# Patient Record
Sex: MALE | Race: WHITE | Employment: FULL TIME | URBAN - METROPOLITAN AREA
[De-identification: names, ages, dates, MRNs, and addresses within clinical notes are randomized per-mention and may not be internally consistent; named-entity substitution may affect disease eponyms.]

---

## 2017-01-27 ENCOUNTER — HOSPITAL ENCOUNTER (EMERGENCY)
Facility: CLINIC | Age: 37
Discharge: HOME | End: 2017-01-28
Attending: EMERGENCY MEDICINE | Admitting: EMERGENCY MEDICINE
Payer: COMMERCIAL

## 2017-01-27 VITALS
OXYGEN SATURATION: 98 % | TEMPERATURE: 98.6 F | SYSTOLIC BLOOD PRESSURE: 121 MMHG | RESPIRATION RATE: 18 BRPM | HEART RATE: 87 BPM | DIASTOLIC BLOOD PRESSURE: 92 MMHG

## 2017-01-27 DIAGNOSIS — S01.81XA FACIAL LACERATION, INITIAL ENCOUNTER: ICD-10-CM

## 2017-01-27 PROCEDURE — 99283 EMERGENCY DEPT VISIT LOW MDM: CPT

## 2017-01-27 PROCEDURE — 12014 RPR F/E/E/N/L/M 5.1-7.5 CM: CPT

## 2017-01-27 RX ORDER — BUPIVACAINE HYDROCHLORIDE AND EPINEPHRINE 5; 5 MG/ML; UG/ML
INJECTION, SOLUTION PERINEURAL
Status: DISCONTINUED
Start: 2017-01-27 | End: 2017-01-28 | Stop reason: HOSPADM

## 2017-01-27 ASSESSMENT — ENCOUNTER SYMPTOMS: WOUND: 1

## 2017-01-27 NOTE — ED AVS SNAPSHOT
[facility] Emergency Department    [address]    [facility] [address]    Phone:  [phone]    Fax:  [phone]                                       Mitesh Amaro   MRN: [id number]    Department:  [facility] Emergency Department   Date of Visit:  1/27/2017           After Visit Summary Signature Page     I have received my discharge instructions, and my questions have been answered. I have discussed any challenges I see with this plan with the nurse or doctor.    ..........................................................................................................................................  Patient/Patient Representative Signature      ..........................................................................................................................................  Patient Representative Print Name and Relationship to Patient    ..................................................               ................................................  Date                                            Time    ..........................................................................................................................................  Reviewed by Signature/Title    ...................................................              ..............................................  Date                                                            Time

## 2017-01-27 NOTE — ED AVS SNAPSHOT
[facility] Emergency Department    [address] [place] [address]    Phone:  [phone]    Fax:  [phone]                                       Mitesh Amaro   MRN: [id number]    Department:  [facility] Emergency Department   Date of Visit:  1/27/2017           Patient Information     Date Of Birth          1980        Your diagnoses for this visit were:     Facial laceration, initial encounter        You were seen by Srini Mejia MD.      Follow-up Information     Follow up with Linus Collier MD In 10 days.    Specialty:  Internal Medicine    Why:  For suture removal, For wound re-check    Contact information:    [facility]  [address] [facility] [address]  [facility] [address]  [phone]          Discharge Instructions         Face Laceration: Suture or Tape  A laceration is a cut through the skin. This will require stitches if it is deep. Minor cuts may be treated with surgical tape.    Home care    Your healthcare provider may prescribe an antibiotic. This is to help prevent infection. Follow all instructions for taking this medicine. Take the medicine every day until it is gone or you are told to stop. You should not have any left over.    The healthcare provider may prescribe medicines for pain. Follow instructions for taking them.    Follow the healthcare provider s instructions on how to care for the cut.    Wash your hands with soap and warm water before and after caring for the cut. This helps prevent infection.    If a bandage was applied and it becomes wet or dirty, replace it. Otherwise, leave it in place for the first 24 hours, then change it once a day or as directed.    If sutures were used, clean the wound daily:    After removing the bandage, wash the area with soap and water. Use a wet cotton swab to loosen and remove any blood or crust that forms.    After cleaning, keep the wound clean and dry. Talk with your doctor  before applying any antibiotic ointment to the wound. Reapply a fresh bandage.    You may remove the bandage to shower as usual after the first 24 hours, but do not soak the area in water (no swimming) until the sutures are removed.    If surgical tape was used, keep the area clean and dry. If it becomes wet, blot it dry with a towel.    Most facial skin wounds heal without problems. However, an infection sometimes occurs despite proper treatment. Therefore, watch for the signs of infection listed below.  Follow-up care  Follow up with your healthcare provider as advised. Be sure to return for suture removal as directed. Ask your provider how long sutures should remain in place. If surgical tape closures were used, you may remove them yourself when your provider recommends if they have not fallen off on their own.  When to seek medical advice  Call your healthcare provider right away if any of these occur:    Wound bleeding not controlled by direct pressure    Signs of infection, including increasing pain in the wound, increasing wound redness or swelling, or pus or bad odor coming from the wound    Fever of 100.4 F (38 C) or higher or as directed by your healthcare provider    Stitches come apart or fall out or surgical tape falls off before 5 days    Wound edges re-open    Wound changes colors    Numbness around the wound     2245-6417 [employer]. [address] [place] [address]. All rights reserved. This information is not intended as a substitute for professional medical care. Always follow your healthcare professional's instructions.          24 Hour Appointment Hotline       To make an appointment at any [facility], call [phone] ([phone]). If you don't have a family doctor or clinic, we will help you find one. [facility] are conveniently located to serve the needs of you and your family.             Review of your medicines      Our records show that you are  taking the medicines listed below. If these are incorrect, please call your family doctor or clinic.        Dose / Directions Last dose taken    neomycin-polymyxin-hydrocortisone [id number] otic suspension   Commonly known as:  CORTISPORIN   Dose:  4 drop   Quantity:  10 mL        Place 4 drops in ear(s) 4 times daily   Refills:  0        NO ACTIVE MEDICATIONS        Refills:  0        simvastatin 20 MG tablet   Commonly known as:  ZOCOR   Dose:  20 mg   Quantity:  90 tablet        Take 1 tablet (20 mg) by mouth At Bedtime   Refills:  1                Orders Needing Specimen Collection     None      Pending Results     No orders found for last 2 day(s).            Pending Culture Results     No orders found for last 2 day(s).             Test Results from your hospital stay            Clinical Quality Measure: Blood Pressure Screening     Your blood pressure was checked while you were in the emergency department today. The last reading we obtained was  BP: (!) 121/92 mmHg . Please read the guidelines below about what these numbers mean and what you should do about them.  If your systolic blood pressure (the top number) is less than 120 and your diastolic blood pressure (the bottom number) is less than 80, then your blood pressure is normal. There is nothing more that you need to do about it.  If your systolic blood pressure (the top number) is 120-139 or your diastolic blood pressure (the bottom number) is 80-89, your blood pressure may be higher than it should be. You should have your blood pressure rechecked within a year by a primary care provider.  If your systolic blood pressure (the top number) is 140 or greater or your diastolic blood pressure (the bottom number) is 90 or greater, you may have high blood pressure. High blood pressure is treatable, but if left untreated over time it can put you at risk for heart attack, stroke, or kidney failure. You should have your blood pressure rechecked by a primary care  provider within the next 4 weeks.  If your provider in the emergency department today gave you specific instructions to follow-up with your doctor or provider even sooner than that, you should follow that instruction and not wait for up to 4 weeks for your follow-up visit.        Thank you for choosing [facility]       Thank you for choosing [facility] for your care. Our goal is always to provide you with excellent care. Hearing back from our patients is one way we can continue to improve our services. Please take a few minutes to complete the written survey that you may receive in the mail after you visit with us. Thank you!        [employer] Information     [employer] gives you secure access to your electronic health record. If you see a primary care provider, you can also send messages to your care team and make appointments. If you have questions, please call your primary care clinic.  If you do not have a primary care provider, please call [phone] and they will assist you.        Care EveryWhere ID     This is your Care EveryWhere ID. This could be used by other organizations to access your [facility] medical records  [id number]        After Visit Summary       This is your record. Keep this with you and show to your community pharmacist(s) and doctor(s) at your next visit.

## 2017-01-28 RX ORDER — GINSENG 100 MG
CAPSULE ORAL
Status: DISCONTINUED
Start: 2017-01-28 | End: 2017-01-28 | Stop reason: HOSPADM

## 2017-01-28 NOTE — ED PROVIDER NOTES
History     Chief Complaint:  Head Laceration    HPI   Mitesh Amaro is a 36 year old male who presents with a head laceration. The patient states that he was walking down the sidewalk this evening when he tripped and hit his head on the sidewalk, causing a laceration to his forehead. He denies any loss of consciousness, vomiting, or any other pertinent symptoms. He is unsure on what his last tetanus shot was.     Allergies:  Contrast dye  Sulfa drugs     Medications:    Cortisporin  Zocor     Past Medical History:    HLD  TBI    Past Surgical History:    History reviewed.  No significant past surgical history.     Family History:    History reviewed.  No significant family history.     Social History:  Relationship status: Single  Tobacco use: Neg  Alcohol use: Pos (2 drinks per week)  The patient presents with his mother.        Review of Systems   Skin: Positive for wound.   Neurological:        Negative for loss of consciousness   All other systems reviewed and are negative.    Physical Exam   First Vitals:  BP: (!) 121/92 mmHg  Pulse: 87  Temp: 98.6  F (37  C)  Resp: 18  SpO2: 98 %    Physical Exam  Constitutional: Patient is well appearing. No distress.  Head: Normal head to toe trauma exam except for a 5.5 cm linear laceration to the forehead.   Mouth/Throat: Oropharynx is clear and moist. No oropharyngeal exudate.  Eyes: Conjunctivae and EOM are normal. No scleral icterus.  Neck: Normal range of motion. Neck supple.   Cardiovascular: Normal rate, regular rhythm, normal heart sounds and intact distal pulses.   Pulmonary/Chest: Breath sounds normal. No respiratory distress.  Abdominal: Soft. Bowel sounds are normal. No distension. No tenderness. No rebound or guarding.   Musculoskeletal: Normal range of motion. No edema or tenderness.   Neurological: Alert and orientated to person, place, and time. No observable focal neuro deficit  Skin: Warm and dry. No rash noted. Not diaphoretic.     Emergency  Department Course     Procedures:     Laceration Repair      LACERATION:  A Linear Laceration minimally Contaminated 5.5 cm laceration.    LOCATION:  Forehead.    FUNCTION:  Distally sensation, circulation and tendon function are intact.    ANESTHESIA:  Local using Bupivacaine total of 5 mLs.    PREPARATION:  Irrigation with Normal Saline.    DEBRIDEMENT:  wound explored, no foreign body found.    CLOSURE:  Wound was closed with One Layer.  Skin closed with 9 x 4.0 Ethylon using 2 vertical mattresses and 7 interrupted sutures..    Interventions:  2329: Bupivacaine, 5 mLs    Emergency Department Course:  Nursing notes and vitals reviewed.  I performed an exam of the patient as documented above.  The above workup was undertaken.  2329: I used bupivacaine to numb the patient's forehead.   0005: I performed the above noted laceration repair. See above for details.    Findings and plan explained to the Patient and mother. Patient discharged home with instructions regarding supportive care, medications, and reasons to return. The importance of close follow-up was reviewed.     Impression & Plan      Medical Decision Making:  Vertical laceration.  Closed with good margin.  Scarring infection and wound care instructions given.    All questions and concerns were answered. The patient was discharged home and recommended to follow up with his primary physician and return with any new or worsening symptoms.     Diagnosis:    ICD-10-CM   1. Facial laceration, initial encounter S01.81XA       Disposition:  Discharge to home with primary care follow up.      Se MARTIN, am serving as a scribe on 1/27/2017 at 11:20 PM to personally document services performed by Srini Mejia MD, based on my observations and the provider's statements to me.     [facility] EMERGENCY DEPARTMENT        Srini Mejia MD  01/28/17 0116

## 2017-01-28 NOTE — DISCHARGE INSTRUCTIONS
Face Laceration: Suture or Tape  A laceration is a cut through the skin. This will require stitches if it is deep. Minor cuts may be treated with surgical tape.    Home care    Your healthcare provider may prescribe an antibiotic. This is to help prevent infection. Follow all instructions for taking this medicine. Take the medicine every day until it is gone or you are told to stop. You should not have any left over.    The healthcare provider may prescribe medicines for pain. Follow instructions for taking them.    Follow the healthcare provider s instructions on how to care for the cut.    Wash your hands with soap and warm water before and after caring for the cut. This helps prevent infection.    If a bandage was applied and it becomes wet or dirty, replace it. Otherwise, leave it in place for the first 24 hours, then change it once a day or as directed.    If sutures were used, clean the wound daily:    After removing the bandage, wash the area with soap and water. Use a wet cotton swab to loosen and remove any blood or crust that forms.    After cleaning, keep the wound clean and dry. Talk with your doctor before applying any antibiotic ointment to the wound. Reapply a fresh bandage.    You may remove the bandage to shower as usual after the first 24 hours, but do not soak the area in water (no swimming) until the sutures are removed.    If surgical tape was used, keep the area clean and dry. If it becomes wet, blot it dry with a towel.    Most facial skin wounds heal without problems. However, an infection sometimes occurs despite proper treatment. Therefore, watch for the signs of infection listed below.  Follow-up care  Follow up with your healthcare provider as advised. Be sure to return for suture removal as directed. Ask your provider how long sutures should remain in place. If surgical tape closures were used, you may remove them yourself when your provider recommends if they have not fallen off on  their own.  When to seek medical advice  Call your healthcare provider right away if any of these occur:    Wound bleeding not controlled by direct pressure    Signs of infection, including increasing pain in the wound, increasing wound redness or swelling, or pus or bad odor coming from the wound    Fever of 100.4 F (38 C) or higher or as directed by your healthcare provider    Stitches come apart or fall out or surgical tape falls off before 5 days    Wound edges re-open    Wound changes colors    Numbness around the wound     6855-4610 [employer]. [address] [place] [address]. All rights reserved. This information is not intended as a substitute for professional medical care. Always follow your healthcare professional's instructions.

## 2017-01-28 NOTE — ED NOTES
36-year-old male presents to the ER with complaints of head laceration. Pt fell and had a large forehead laceration. Denies LOC

## 2017-11-13 ENCOUNTER — TELEPHONE (OUTPATIENT)
Dept: FAMILY MEDICINE | Facility: CLINIC | Age: 37
End: 2017-11-13

## 2017-11-13 DIAGNOSIS — M25.569 ACUTE KNEE PAIN, UNSPECIFIED LATERALITY: Primary | ICD-10-CM

## 2017-11-13 NOTE — TELEPHONE ENCOUNTER
Reason for Call: Request for an order or referral:    Order or referral being requested: referral or order for [facility]    Date needed: as soon as possible    Has the patient been seen by the PCP for this problem? NO    Additional comments: patient has been deployed.  He is back in town now, and did schedule a physical with Dr. Collier  For 11/21.  He has had knee issues, and needs to get clearance from an orthopedic specialist.  Is there a way he can get this referral asap so he can schedule with them.    Phone number Patient can be reached at:  Home number on file [phone] (home)    Best Time:  asap    Can we leave a detailed message on this number?  YES    Call taken on 11/13/2017 at 11:06 AM by Andria Jones  .

## 2017-12-05 ENCOUNTER — OFFICE VISIT (OUTPATIENT)
Dept: FAMILY MEDICINE | Facility: CLINIC | Age: 37
End: 2017-12-05
Payer: COMMERCIAL

## 2017-12-05 VITALS
DIASTOLIC BLOOD PRESSURE: 70 MMHG | WEIGHT: 224 LBS | HEART RATE: 52 BPM | SYSTOLIC BLOOD PRESSURE: 119 MMHG | TEMPERATURE: 98 F | OXYGEN SATURATION: 98 % | HEIGHT: 75 IN | BODY MASS INDEX: 27.85 KG/M2

## 2017-12-05 DIAGNOSIS — Z00.00 ROUTINE GENERAL MEDICAL EXAMINATION AT A HEALTH CARE FACILITY: Primary | ICD-10-CM

## 2017-12-05 DIAGNOSIS — Z82.49 FAMILY HISTORY OF EARLY CAD: ICD-10-CM

## 2017-12-05 LAB
ERYTHROCYTE [DISTWIDTH] IN BLOOD BY AUTOMATED COUNT: 13.4 % (ref 10–15)
HCT VFR BLD AUTO: 43.3 % (ref 40–53)
HGB BLD-MCNC: 14.2 G/DL (ref 13.3–17.7)
MCH RBC QN AUTO: 31.1 PG (ref 26.5–33)
MCHC RBC AUTO-ENTMCNC: 32.8 G/DL (ref 31.5–36.5)
MCV RBC AUTO: 95 FL (ref 78–100)
PLATELET # BLD AUTO: 182 10E9/L (ref 150–450)
RBC # BLD AUTO: 4.56 10E12/L (ref 4.4–5.9)
WBC # BLD AUTO: 4.1 10E9/L (ref 4–11)

## 2017-12-05 PROCEDURE — 36415 COLL VENOUS BLD VENIPUNCTURE: CPT | Performed by: INTERNAL MEDICINE

## 2017-12-05 PROCEDURE — 85027 COMPLETE CBC AUTOMATED: CPT | Performed by: INTERNAL MEDICINE

## 2017-12-05 PROCEDURE — 80061 LIPID PANEL: CPT | Performed by: INTERNAL MEDICINE

## 2017-12-05 PROCEDURE — 80053 COMPREHEN METABOLIC PANEL: CPT | Performed by: INTERNAL MEDICINE

## 2017-12-05 PROCEDURE — 99395 PREV VISIT EST AGE 18-39: CPT | Performed by: INTERNAL MEDICINE

## 2017-12-05 NOTE — PATIENT INSTRUCTIONS
Preventive Health Recommendations  Male Ages 26 - 39    Yearly exam:             See your health care provider every year in order to  o   Review health changes.   o   Discuss preventive care.    o   Review your medicines if your doctor has prescribed any.    You should be tested each year for STDs (sexually transmitted diseases), if you re at risk.     After age 35, talk to your provider about cholesterol testing. If you are at risk for heart disease, have your cholesterol tested at least every 5 years.     If you are at risk for diabetes, you should have a diabetes test (fasting glucose).  Shots: Get a flu shot each year. Get a tetanus shot every 10 years.     Nutrition:    Eat at least 5 servings of fruits and vegetables daily.     Eat whole-grain bread, whole-wheat pasta and brown rice instead of white grains and rice.     Talk to your provider about Calcium and Vitamin D.     Lifestyle    Exercise for at least 150 minutes a week (30 minutes a day, 5 days a week). This will help you control your weight and prevent disease.     Limit alcohol to one drink per day.     No smoking.     Wear sunscreen to prevent skin cancer.     See your dentist every six months for an exam and cleaning.   (Z00.00) Routine general medical examination at a health care facility  (primary encounter diagnosis)  Comment: For routine exam, we will draw labs as ordered, cholesterol, diabetes mellitus check, liver function, renal function, and refer to dermatology.  We will also update vaccination history.  Plan: CBC with platelets, Lipid panel reflex to         direct LDL Fasting, Comprehensive metabolic         panel, DERMATOLOGY REFERRAL            [facility] - [phone]

## 2017-12-05 NOTE — NURSING NOTE
"Chief Complaint   Patient presents with     Physical     Fasting        Initial /70 (BP Location: Right arm, Patient Position: Chair, Cuff Size: Adult Large)  Pulse 52  Temp 98  F (36.7  C) (Tympanic)  Ht 6' 3\" (1.905 m)  Wt 224 lb (101.6 kg)  SpO2 98%  BMI 28 kg/m2 Estimated body mass index is 28 kg/(m^2) as calculated from the following:    Height as of this encounter: 6' 3\" (1.905 m).    Weight as of this encounter: 224 lb (101.6 kg)..    BP completed using cuff size: regular  MEDICATIONS REVIEWED  SOCIAL AND FAMILY HX REVIEWED  Dina Dorsey CMA  "

## 2017-12-05 NOTE — PROGRESS NOTES
SUBJECTIVE:   CC: Mitesh Amaro is an 37 year old male who presents for preventative health visit.     Healthy Habits:    Do you get at least three servings of calcium containing foods daily (dairy, green leafy vegetables, etc.)? yes    Amount of exercise or daily activities, outside of work: 6 day(s) per week    Problems taking medications regularly not applicable    Medication side effects: No    Have you had an eye exam in the past two years? yes    Do you see a dentist twice per year? yes  Do you have sleep apnea, excessive snoring or daytime drowsiness?no      Today's PHQ-2 Score:   PHQ-2 ( 1999 [employer]) 5/15/2013   Q1: Little interest or pleasure in doing things 0   Q2: Feeling down, depressed or hopeless 0   PHQ-2 Score 0         Abuse: Current or Past(Physical, Sexual or Emotional)- No  Do you feel safe in your environment - Yes  Social History   Substance Use Topics     Smoking status: Never Smoker     Smokeless tobacco: Never Used     Alcohol use 1.2 oz/week     2 Standard drinks or equivalent per week     The patient does not drink >3 drinks per day nor >7 drinks per week.    Last PSA: No results found for: PSA    Reviewed orders with patient. Reviewed health maintenance and updated orders accordingly - Yes  Labs reviewed in EPIC    Reviewed and updated as needed this visit by clinical staff         Reviewed and updated as needed this visit by Provider        Past Medical History:   Diagnosis Date     Hyperlipidemia LDL goal <160 10/8/2013        ROS:  C: NEGATIVE for fever, chills, change in weight  I: NEGATIVE for worrisome rashes, moles or lesions  E: NEGATIVE for vision changes or irritation  ENT: NEGATIVE for ear, mouth and throat problems  R: NEGATIVE for significant cough or SOB  CV: NEGATIVE for chest pain, palpitations or peripheral edema  GI: NEGATIVE for nausea, abdominal pain, heartburn, or change in bowel habits   male: negative for dysuria, hematuria, decreased urinary stream, erectile  "dysfunction, urethral discharge  M: NEGATIVE for significant arthralgias or myalgia  N: NEGATIVE for weakness, dizziness or paresthesias  P: NEGATIVE for changes in mood or affect    OBJECTIVE:   /70 (BP Location: Right arm, Patient Position: Chair, Cuff Size: Adult Large)  Pulse 52  Temp 98  F (36.7  C) (Tympanic)  Ht 6' 3\" (1.905 m)  Wt 224 lb (101.6 kg)  SpO2 98%  BMI 28 kg/m2  EXAM:  GENERAL: healthy, alert and no distress  EYES: Eyes grossly normal to inspection, PERRL and conjunctivae and sclerae normal  HENT: ear canals and TM's normal, nose and mouth without ulcers or lesions  NECK: no adenopathy, no asymmetry, masses, or scars and thyroid normal to palpation  RESP: lungs clear to auscultation - no rales, rhonchi or wheezes  CV: regular rate and rhythm, normal S1 S2, no S3 or S4, no murmur, click or rub, no peripheral edema and peripheral pulses strong  ABDOMEN: soft, nontender, no hepatosplenomegaly, no masses and bowel sounds normal  MS: no gross musculoskeletal defects noted, no edema  SKIN: no suspicious lesions or rashes  NEURO: Normal strength and tone, mentation intact and speech normal  PSYCH: mentation appears normal, affect normal/bright    ASSESSMENT/PLAN:   (Z00.00) Routine general medical examination at a health care facility  (primary encounter diagnosis)  Comment: For routine exam, we will draw labs as ordered, cholesterol, diabetes mellitus check, liver function, renal function, and refer to dermatology.  We will also update vaccination history.  Plan: CBC with platelets, Lipid panel reflex to         direct LDL Fasting, Comprehensive metabolic         panel, DERMATOLOGY REFERRAL            [facility] - [phone]         COUNSELING:  Reviewed preventive health counseling, as reflected in patient instructions           reports that he has never smoked. He has never used smokeless tobacco.      Estimated body mass index is 27.5 kg/(m^2) as calculated from the following:    " "Height as of 2/8/16: 6' 3\" (1.905 m).    Weight as of 2/8/16: 220 lb (99.8 kg).         Counseling Resources:  ATP IV Guidelines  Pooled Cohorts Equation Calculator  FRAX Risk Assessment  ICSI Preventive Guidelines  Dietary Guidelines for Americans, 2010  USDA's MyPlate  ASA Prophylaxis  Lung CA Screening    Linus Collier MD, MD  [facility]  "

## 2017-12-05 NOTE — MR AVS SNAPSHOT
After Visit Summary   12/5/2017    Mitesh Amaro    MRN: [id number]           Patient Information     Date Of Birth          1980        Visit Information        Provider Department      12/5/2017 1:30 PM Linus Collier MD [facility]        Today's Diagnoses     Routine general medical examination at a health care facility    -  1      Care Instructions      Preventive Health Recommendations  Male Ages 26 - 39    Yearly exam:             See your health care provider every year in order to  o   Review health changes.   o   Discuss preventive care.    o   Review your medicines if your doctor has prescribed any.    You should be tested each year for STDs (sexually transmitted diseases), if you re at risk.     After age 35, talk to your provider about cholesterol testing. If you are at risk for heart disease, have your cholesterol tested at least every 5 years.     If you are at risk for diabetes, you should have a diabetes test (fasting glucose).  Shots: Get a flu shot each year. Get a tetanus shot every 10 years.     Nutrition:    Eat at least 5 servings of fruits and vegetables daily.     Eat whole-grain bread, whole-wheat pasta and brown rice instead of white grains and rice.     Talk to your provider about Calcium and Vitamin D.     Lifestyle    Exercise for at least 150 minutes a week (30 minutes a day, 5 days a week). This will help you control your weight and prevent disease.     Limit alcohol to one drink per day.     No smoking.     Wear sunscreen to prevent skin cancer.     See your dentist every six months for an exam and cleaning.   (Z00.00) Routine general medical examination at a health care facility  (primary encounter diagnosis)  Comment: For routine exam, we will draw labs as ordered, cholesterol, diabetes mellitus check, liver function, renal function, and refer to dermatology.  We will also update vaccination history.  Plan: CBC with platelets, Lipid panel  reflex to         direct LDL Fasting, Comprehensive metabolic         panel, DERMATOLOGY REFERRAL                    Follow-ups after your visit        Additional Services     DERMATOLOGY REFERRAL       Your provider has referred you to: [facility]: [facility]on [phone]   [email or web address]    Please be aware that coverage of these services is subject to the terms and limitations of your health insurance plan.  Call member services at your health plan with any benefit or coverage questions.      Please bring the following with you to your appointment:    (1) Any X-Rays, CTs or MRIs which have been performed.  Contact the facility where they were done to arrange for  prior to your scheduled appointment.    (2) List of current medications  (3) This referral request   (4) Any documents/labs given to you for this referral                  Who to contact     If you have questions or need follow up information about today's clinic visit or your schedule please contact [facility] directly at [phone].  Normal or non-critical lab and imaging results will be communicated to you by Cameramahart, letter or phone within 4 business days after the clinic has received the results. If you do not hear from us within 7 days, please contact the clinic through Cameramahart or phone. If you have a critical or abnormal lab result, we will notify you by phone as soon as possible.  Submit refill requests through [employer] or call your pharmacy and they will forward the refill request to us. Please allow 3 business days for your refill to be completed.          Additional Information About Your Visit        CameramaharBravoavia Information     [employer] gives you secure access to your electronic health record. If you see a primary care provider, you can also send messages to your care team and make appointments. If you have questions, please call your primary care clinic.  If you do not have a primary care  "provider, please call [phone] and they will assist you.        Care EveryWhere ID     This is your Care EveryWhere ID. This could be used by other organizations to access your [facility] medical records  [id number]        Your Vitals Were     Pulse Temperature Height Pulse Oximetry BMI (Body Mass Index)       52 98  F (36.7  C) (Tympanic) 6' 3\" (1.905 m) 98% 28 kg/m2        Blood Pressure from Last 3 Encounters:   12/05/17 119/70   01/27/17 (!) 121/92   02/08/16 (!) 133/91    Weight from Last 3 Encounters:   12/05/17 224 lb (101.6 kg)   02/08/16 220 lb (99.8 kg)   10/28/13 217 lb 6.4 oz (98.6 kg)              We Performed the Following     CBC with platelets     Comprehensive metabolic panel     DERMATOLOGY REFERRAL     Lipid panel reflex to direct LDL Fasting        Primary Care Provider Office Phone # Fax #    Linus Collier [phone] [address] [address]        Equal Access to Services     [facility]: Hadii luis manuel ku hadasho Sonain, waaxda lujoaquinaadaha, qaybta kaalmalamberto connell, lukas best . So [facility] [phone].    ATENCIÓN: Si habla español, tiene a pina disposición servicios gratuitos de asistencia lingüística. [facility] [phone].    We comply with applicable federal civil rights laws and [place] laws. We do not discriminate on the basis of race, color, national origin, age, disability, sex, sexual orientation, or gender identity.            Thank you!     Thank you for choosing [facility]  for your care. Our goal is always to provide you with excellent care. Hearing back from our patients is one way we can continue to improve our services. Please take a few minutes to complete the written survey that you may receive in the mail after your visit with us. Thank you!             Your Updated Medication List - Protect others around you: Learn how to safely use, store and throw away your medicines at " www.disposemymeds.org.          This list is accurate as of: 12/5/17  2:36 PM.  Always use your most recent med list.                   Brand Name Dispense Instructions for use Diagnosis    NO ACTIVE MEDICATIONS

## 2017-12-06 LAB
ALBUMIN SERPL-MCNC: 4.3 G/DL (ref 3.4–5)
ALP SERPL-CCNC: 43 U/L (ref 40–150)
ALT SERPL W P-5'-P-CCNC: 29 U/L (ref 0–70)
ANION GAP SERPL CALCULATED.3IONS-SCNC: 5 MMOL/L (ref 3–14)
AST SERPL W P-5'-P-CCNC: 20 U/L (ref 0–45)
BILIRUB SERPL-MCNC: 0.7 MG/DL (ref 0.2–1.3)
BUN SERPL-MCNC: 17 MG/DL (ref 7–30)
CALCIUM SERPL-MCNC: 8.9 MG/DL (ref 8.5–10.1)
CHLORIDE SERPL-SCNC: 107 MMOL/L (ref 94–109)
CHOLEST SERPL-MCNC: 295 MG/DL
CO2 SERPL-SCNC: 29 MMOL/L (ref 20–32)
CREAT SERPL-MCNC: 1 MG/DL (ref 0.66–1.25)
GFR SERPL CREATININE-BSD FRML MDRD: 85 ML/MIN/1.7M2
GLUCOSE SERPL-MCNC: 91 MG/DL (ref 70–99)
HDLC SERPL-MCNC: 64 MG/DL
LDLC SERPL CALC-MCNC: 210 MG/DL
NONHDLC SERPL-MCNC: 231 MG/DL
POTASSIUM SERPL-SCNC: 4.3 MMOL/L (ref 3.4–5.3)
PROT SERPL-MCNC: 7.5 G/DL (ref 6.8–8.8)
SODIUM SERPL-SCNC: 141 MMOL/L (ref 133–144)
TRIGL SERPL-MCNC: 104 MG/DL

## 2017-12-06 NOTE — PROGRESS NOTES
Jay Sagastume,    I had the opportunity to review your recent labs and a summary of your labs reads as follows:    Your complete blood counts show no sign of anemia, normal white blood cell count and platelets.  Your comprehensive metabolic panel showed normal renal function, normal liver function, and normal fasting blood glucose indicating no evidence of diabetes mellitus.  Your fasting lipid panel show  - normal HDL (good) cholesterol -as your goal is greater than 40  - elevated LDL (bad) cholesterol as your goal is less than 160  - normal triglyceride levels    Let's plan to obtain the CT coronary calcium score and this could help to determine need for statin, however with LDL >200 we may still wish to start a low dose statin.  [facility] [facility] - [phone]       Sincerely,  Linus Collier MD

## 2018-01-03 ENCOUNTER — TELEPHONE (OUTPATIENT)
Dept: FAMILY MEDICINE | Facility: CLINIC | Age: 38
End: 2018-01-03

## 2018-01-03 DIAGNOSIS — L98.8 OTHER SPECIFIED DISORDERS OF THE SKIN AND SUBCUTANEOUS TISSUE: Primary | ICD-10-CM

## 2018-01-03 NOTE — TELEPHONE ENCOUNTER
Updated referral is pending. Please review,advise and sign. Thanks  Leydi Adams- [facility]

## 2018-01-03 NOTE — TELEPHONE ENCOUNTER
Reason for Call: Request for an order or referral:    Order or referral being requested: Referral for dermatology    Date needed: as soon as possible    Has the patient been seen by the PCP for this problem? YES    Additional comments: Pt recently changed insurance to [employer] and needs referral sent again to [facility] because they are not seeing a referral. Referral in chart says [facility]. Pt has appointment set for 1/8    Phone number Patient can be reached at:  Home number on file [phone] (home)    Best Time:  Anytime    Can we leave a detailed message on this number?  YES    Call taken on 1/3/2018 at 3:10 PM by Geraldine Benavides

## 2019-08-20 ENCOUNTER — OFFICE VISIT (OUTPATIENT)
Dept: FAMILY MEDICINE | Facility: CLINIC | Age: 39
End: 2019-08-20
Payer: OTHER MISCELLANEOUS

## 2019-08-20 VITALS
WEIGHT: 232 LBS | SYSTOLIC BLOOD PRESSURE: 118 MMHG | HEART RATE: 60 BPM | OXYGEN SATURATION: 97 % | DIASTOLIC BLOOD PRESSURE: 77 MMHG | HEIGHT: 75 IN | BODY MASS INDEX: 28.85 KG/M2 | TEMPERATURE: 98 F

## 2019-08-20 DIAGNOSIS — E78.5 HYPERLIPIDEMIA LDL GOAL <160: ICD-10-CM

## 2019-08-20 DIAGNOSIS — Z01.818 PREOP GENERAL PHYSICAL EXAM: Primary | ICD-10-CM

## 2019-08-20 LAB
ERYTHROCYTE [DISTWIDTH] IN BLOOD BY AUTOMATED COUNT: 12.5 % (ref 10–15)
HCT VFR BLD AUTO: 46 % (ref 40–53)
HGB BLD-MCNC: 15.4 G/DL (ref 13.3–17.7)
MCH RBC QN AUTO: 31.6 PG (ref 26.5–33)
MCHC RBC AUTO-ENTMCNC: 33.5 G/DL (ref 31.5–36.5)
MCV RBC AUTO: 94 FL (ref 78–100)
PLATELET # BLD AUTO: 196 10E9/L (ref 150–450)
RBC # BLD AUTO: 4.88 10E12/L (ref 4.4–5.9)
WBC # BLD AUTO: 4.4 10E9/L (ref 4–11)

## 2019-08-20 PROCEDURE — 85027 COMPLETE CBC AUTOMATED: CPT | Performed by: INTERNAL MEDICINE

## 2019-08-20 PROCEDURE — 99214 OFFICE O/P EST MOD 30 MIN: CPT | Performed by: INTERNAL MEDICINE

## 2019-08-20 PROCEDURE — 36415 COLL VENOUS BLD VENIPUNCTURE: CPT | Performed by: INTERNAL MEDICINE

## 2019-08-20 PROCEDURE — 80048 BASIC METABOLIC PNL TOTAL CA: CPT | Performed by: INTERNAL MEDICINE

## 2019-08-20 ASSESSMENT — MIFFLIN-ST. JEOR: SCORE: 2060.04

## 2019-08-20 NOTE — PATIENT INSTRUCTIONS
Before Your Surgery      Call your surgeon if there is any change in your health. This includes signs of a cold or flu (such as a sore throat, runny nose, cough, rash or fever).    Do not smoke, drink alcohol or take over the counter medicine (unless your surgeon or primary care doctor tells you to) for the 24 hours before and after surgery.    If you take prescribed drugs: Follow your doctor s orders about which medicines to take and which to stop until after surgery.    Eating and drinking prior to surgery: follow the instructions from your surgeon    Take a shower or bath the night before surgery. Use the soap your surgeon gave you to gently clean your skin. If you do not have soap from your surgeon, use your regular soap. Do not shave or scrub the surgery site.  Wear clean pajamas and have clean sheets on your bed.     (Z01.818) Preop general physical exam  (primary encounter diagnosis)  Comment: you are medically optimized for your upcoming surgery pending labs.  Hold all aspirin, NSAIDS until after surgery.  Don't drink alcohol until after surgery  Plan: CBC with platelets, Basic metabolic panel            (E78.5) Hyperlipidemia LDL goal <160  Comment: check fasting lipid panel in 3 months.  We will refer for CT Calcium score [facility] - [phone]   Plan: CT Coronary Calcium Scan

## 2019-08-20 NOTE — PROGRESS NOTES
[address] [address]  [phone]  Dept: [phone]    PRE-OP EVALUATION:  Today's date: 2019    Mitesh Amaro (: 1980) presents for pre-operative evaluation assessment as requested by Dr. Mantilla.  He requires evaluation and anesthesia risk assessment prior to undergoing surgery/procedure for treatment of  Right ulnar TFCC tear .    Proposed Surgery/ Procedure: Right distal ulna cartilage injury  Date of Surgery/ Procedure: 2019  Time of Surgery/ Procedure: 7:20 a.m.  Hospital/Surgical Facility: [facility]  Fax number for surgical facility: [phone]  Primary Physician: Linus Collier  Type of Anesthesia Anticipated: General    Patient has a Health Care Directive or Living Will:  NO    1. NO - Do you have a history of heart attack, stroke, stent, bypass or surgery on an artery in the head, neck, heart or legs?  2. NO - Do you ever have any pain or discomfort in your chest?  3. NO - Do you have a history of  Heart Failure?  4. NO - Are you troubled by shortness of breath when: walking on the level, up a slight hill or at night?  5. NO - Do you currently have a cold, bronchitis or other respiratory infection?  6. NO - Do you have a cough, shortness of breath or wheezing?  7. NO - Do you sometimes get pains in the calves of your legs when you walk?  8. NO - Do you or anyone in your family have previous history of blood clots?  9. NO - Do you or does anyone in your family have a serious bleeding problem such as prolonged bleeding following surgeries or cuts?  10. NO - Have you ever had problems with anemia or been told to take iron pills?  11. NO - Have you had any abnormal blood loss such as black, tarry or bloody stools, or abnormal vaginal bleeding?  12. NO - Have you ever had a blood transfusion?  13. NO - Have you or any of your relatives ever had problems with anesthesia?  14. NO - Do you have sleep apnea, excessive  "snoring or daytime drowsiness?  15. NO - Do you have any prosthetic heart valves?  16. NO - Do you have prosthetic joints?  17. NO - Is there any chance that you may be pregnant?      HPI:     HPI related to upcoming procedure: trauma to the right wrist - TFCC fracture      See problem list for active medical problems.  Problems all longstanding and stable, except as noted/documented.  See ROS for pertinent symptoms related to these conditions.      MEDICAL HISTORY:     Patient Active Problem List    Diagnosis Date Noted     Hyperlipidemia LDL goal <160 10/08/2013     Priority: Medium     TBI (traumatic brain injury) (H) 05/15/2013     Priority: Medium     Suffered in 09/2012        Past Medical History:   Diagnosis Date     Hyperlipidemia LDL goal <160 10/8/2013     No past surgical history on file.  Current Outpatient Medications   Medication Sig Dispense Refill     NO ACTIVE MEDICATIONS        OTC products: NSAIDS    Allergies   Allergen Reactions     Contrast Dye      Sulfa Drugs       Latex Allergy: NO    Social History     Tobacco Use     Smoking status: Never Smoker     Smokeless tobacco: Never Used   Substance Use Topics     Alcohol use: Yes     Alcohol/week: 1.2 oz     Types: 2 Standard drinks or equivalent per week     History   Drug Use No       REVIEW OF SYSTEMS:   CONSTITUTIONAL: NEGATIVE for fever, chills, change in weight  ENT/MOUTH: NEGATIVE for ear, mouth and throat problems  RESP: NEGATIVE for significant cough or SOB  CV: NEGATIVE for chest pain, palpitations or peripheral edema    EXAM:   /77 (BP Location: Right arm, Cuff Size: Adult Large)   Pulse 60   Temp 98  F (36.7  C) (Oral)   Ht 1.892 m (6' 2.5\")   Wt 105.2 kg (232 lb)   SpO2 97%   BMI 29.39 kg/m      GENERAL APPEARANCE: healthy, alert and no distress     EYES: EOMI,  PERRL     HENT: ear canals and TM's normal and nose and mouth without ulcers or lesions     NECK: no adenopathy, no asymmetry, masses, or scars and thyroid normal " to palpation     RESP: lungs clear to auscultation - no rales, rhonchi or wheezes     CV: regular rates and rhythm, normal S1 S2, no S3 or S4 and no murmur, click or rub     ABDOMEN:  soft, nontender, no HSM or masses and bowel sounds normal     MS: extremities normal- no gross deformities noted, no evidence of inflammation in joints, FROM in all extremities.     SKIN: no suspicious lesions or rashes     NEURO: Normal strength and tone, sensory exam grossly normal, mentation intact and speech normal     PSYCH: mentation appears normal. and affect normal/bright     LYMPHATICS: No cervical adenopathy    DIAGNOSTICS:   No labs or EKG required for low risk surgery (cataract, skin procedure, breast biopsy, etc)    Recent Labs   Lab Test 12/05/17  1446 03/26/14  1715 10/08/13  1041   HGB 14.2 14.4 15.5    190 193     --  142   POTASSIUM 4.3  --  5.0   CR 1.00  --  1.00        IMPRESSION:   Reason for surgery/procedure: TFCC tear  Diagnosis/reason for consult: Pre-operative Evalaution    The proposed surgical procedure is considered LOW risk.    REVISED CARDIAC RISK INDEX  The patient has the following serious cardiovascular risks for perioperative complications such as (MI, PE, VFib and 3  AV Block):  No serious cardiac risks  INTERPRETATION: 0 risks: Class I (very low risk - 0.4% complication rate)    The patient has the following additional risks for perioperative complications:  No identified additional risks      ICD-10-CM    1. Preop general physical exam Z01.818        RECOMMENDATIONS:     (Z01.818) Preop general physical exam  (primary encounter diagnosis)  Comment: you are medically optimized for your upcoming surgery pending labs.  Hold all aspirin, NSAIDS until after surgery.  Don't drink alcohol until after surgery  Plan: CBC with platelets, Basic metabolic panel            (E78.5) Hyperlipidemia LDL goal <160  Comment: check fasting lipid panel in 3 months.  We will refer for CT Calcium score  [facility] - [phone]   Plan: CT Coronary Calcium Scan                     Signed Electronically by: Linus Collier MD, MD    Copy of this evaluation report is provided to requesting physician.    [facility] Preop Guidelines    Revised Cardiac Risk Index

## 2019-08-21 LAB
ANION GAP SERPL CALCULATED.3IONS-SCNC: 8 MMOL/L (ref 3–14)
BUN SERPL-MCNC: 17 MG/DL (ref 7–30)
CALCIUM SERPL-MCNC: 8.7 MG/DL (ref 8.5–10.1)
CHLORIDE SERPL-SCNC: 107 MMOL/L (ref 94–109)
CO2 SERPL-SCNC: 26 MMOL/L (ref 20–32)
CREAT SERPL-MCNC: 0.86 MG/DL (ref 0.66–1.25)
GFR SERPL CREATININE-BSD FRML MDRD: >90 ML/MIN/{1.73_M2}
GLUCOSE SERPL-MCNC: 96 MG/DL (ref 70–99)
POTASSIUM SERPL-SCNC: 4.3 MMOL/L (ref 3.4–5.3)
SODIUM SERPL-SCNC: 141 MMOL/L (ref 133–144)

## 2019-08-22 ENCOUNTER — NURSE TRIAGE (OUTPATIENT)
Dept: NURSING | Facility: CLINIC | Age: 39
End: 2019-08-22

## 2019-08-22 NOTE — RESULT ENCOUNTER NOTE
Jay Sagastume,    I have had the opportunity to review your recent results and an interpretation is as follows:  Labs are stable for your upcoming surgery      Sincerely,  Linus Collier MD

## 2019-08-22 NOTE — TELEPHONE ENCOUNTER
Call from [facility] asking for preop info.  Info sent to [phone] fax number.  Lilian ESCOBAR RN [facility]

## 2019-09-05 ENCOUNTER — HOSPITAL ENCOUNTER (OUTPATIENT)
Dept: CARDIOLOGY | Facility: CLINIC | Age: 39
Discharge: HOME | End: 2019-09-05
Attending: INTERNAL MEDICINE | Admitting: INTERNAL MEDICINE
Payer: COMMERCIAL

## 2019-09-05 DIAGNOSIS — E78.5 HYPERLIPIDEMIA LDL GOAL <160: ICD-10-CM

## 2019-09-05 PROCEDURE — 75571 CT HRT W/O DYE W/CA TEST: CPT | Mod: 26 | Performed by: INTERNAL MEDICINE

## 2019-09-05 PROCEDURE — 75571 CT HRT W/O DYE W/CA TEST: CPT

## 2019-09-06 NOTE — RESULT ENCOUNTER NOTE
Jay Sagastume,    I have had the opportunity to review your recent results and an interpretation is as follows:  Congratulaions on your excellent results!  Your score was 0.  This indicates very low risk of coronary artery disease.   To help lower your LDL cholesterol I would recommend a you look into the Mediterranean Diet - typically high in fruits, vegetables, whole grains, beans, nuts, and seeds; include olive oil as an important source of monounsaturated fat; and allow low to moderate wine consumption.  There are also typically low to moderate amounts of fish, poultry, and dairy products, with little red meat in this diet.  We can recheck your LDL in about 3 months.    Sincerely,  Linus Collier MD

## 2019-09-08 NOTE — RESULT ENCOUNTER NOTE
Jay Sagastume,    I have had the opportunity to review your recent results and an interpretation is as follows:  Your CT does not show any concerning findings     Sincerely,  Linus Collier MD

## 2019-12-16 ENCOUNTER — HEALTH MAINTENANCE LETTER (OUTPATIENT)
Age: 39
End: 2019-12-16

## 2021-01-15 ENCOUNTER — HEALTH MAINTENANCE LETTER (OUTPATIENT)
Age: 41
End: 2021-01-15

## 2021-03-25 NOTE — PROGRESS NOTES
SUBJECTIVE:   CC: Mitesh Amaro is an 40 year old male who presents for preventative health visit.       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  He is currently working with psychologist for some PTSD related to his work as a .  No medications.      He has hx of untreated hyperlipidemia does run in family.      PROBLEMS TO ADD ON...    Today's PHQ-2 Score:   PHQ-2 ( 1999 [employer]) 3/26/2021   Q1: Little interest or pleasure in doing things 3   Q2: Feeling down, depressed or hopeless 3   PHQ-2 Score 6   Q1: Little interest or pleasure in doing things Nearly every day   Q2: Feeling down, depressed or hopeless Nearly every day   PHQ-2 Score 6       Abuse: Current or Past(Physical, Sexual or Emotional)- No  Do you feel safe in your environment? Yes    Have you ever done Advance Care Planning? (For example, a Health Directive, POLST, or a discussion with a medical provider or your loved ones about your wishes): No, advance care planning information given to patient to review.  Patient declined advance care planning discussion at this time.    Social History     Tobacco Use     Smoking status: Never Smoker     Smokeless tobacco: Never Used   Substance Use Topics     Alcohol use: Yes     Alcohol/week: 2.0 standard drinks     Types: 2 Standard drinks or equivalent per week     If you drink alcohol do you typically have >3 drinks per day or >7 drinks per week?  "No    Alcohol Use 3/26/2021   Prescreen: >3 drinks/day or >7 drinks/week? No   Prescreen: >3 drinks/day or >7 drinks/week? -   No flowsheet data found.    Last PSA: No results found for: PSA    Reviewed orders with patient. Reviewed health maintenance and updated orders accordingly - Yes  Labs reviewed in EPIC    Reviewed and updated as needed this visit by clinical staff  Tobacco  Allergies  Meds              Reviewed and updated as needed this visit by Provider                    Review of Systems  CONSTITUTIONAL: NEGATIVE for fever, chills, change in weight  INTEGUMENTARY/SKIN: NEGATIVE for worrisome rashes, moles or lesions  EYES: NEGATIVE for vision changes or irritation  ENT: NEGATIVE for ear, mouth and throat problems  RESP: NEGATIVE for significant cough or SOB  CV: NEGATIVE for chest pain, palpitations or peripheral edema  GI: NEGATIVE for nausea, abdominal pain, heartburn, or change in bowel habits   male: negative for dysuria, hematuria, decreased urinary stream, erectile dysfunction, urethral discharge  MUSCULOSKELETAL: NEGATIVE for significant arthralgias or myalgia  NEURO: NEGATIVE for weakness, dizziness or paresthesias  PSYCHIATRIC: NEGATIVE for changes in mood or affect    OBJECTIVE:   /84   Pulse 61   Temp 98  F (36.7  C) (Tympanic)   Ht 1.867 m (6' 1.5\")   Wt 107 kg (236 lb)   SpO2 99%   BMI 30.71 kg/m      Physical Exam  GENERAL: alert and no distress  EYES: Eyes grossly normal to inspection, PERRL and conjunctivae and sclerae normal  HENT: ear canals and TM's normal, nose and mouth without ulcers or lesions  NECK: no adenopathy, no asymmetry, masses, or scars and thyroid normal to palpation  RESP: lungs clear to auscultation - no rales, rhonchi or wheezes  CV: regular rate and rhythm, normal S1 S2, no S3 or S4, no murmur, click or rub, no peripheral edema and peripheral pulses strong  ABDOMEN: soft, nontender, no hepatosplenomegaly, no masses and bowel sounds normal  MS: no " "gross musculoskeletal defects noted, no edema  SKIN: no suspicious lesions or rashes  NEURO: Normal strength and tone, mentation intact and speech normal  PSYCH: mentation appears normal, affect normal/bright    Diagnostic Test Results:  Labs reviewed in Epic    ASSESSMENT/PLAN:   1. Routine general medical examination at a health care facility    - CBC with platelets differential  - TSH with free T4 reflex    2. Hyperlipidemia LDL goal <160    - Comprehensive metabolic panel  - Lipid panel reflex to direct LDL Fasting    3. PTSD (post-traumatic stress disorder)  Working with specialty at this time.        Patient has been advised of split billing requirements and indicates understanding: Yes  COUNSELING:   Reviewed preventive health counseling, as reflected in patient instructions       Regular exercise       Healthy diet/nutrition    Estimated body mass index is 30.71 kg/m  as calculated from the following:    Height as of this encounter: 1.867 m (6' 1.5\").    Weight as of this encounter: 107 kg (236 lb).     Weight management plan: Discussed healthy diet and exercise guidelines    He reports that he has never smoked. He has never used smokeless tobacco.      Counseling Resources:  ATP IV Guidelines  Pooled Cohorts Equation Calculator  FRAX Risk Assessment  ICSI Preventive Guidelines  Dietary Guidelines for Americans, 2010  USDA's MyPlate  ASA Prophylaxis  Lung CA Screening    [facility]  Answers for HPI/ROS submitted by the patient on 3/26/2021   Annual Exam:  If you checked off any problems, how difficult have these problems made it for you to do your work, take care of things at home, or get along with other people?: Extremely difficult  PHQ9 TOTAL SCORE: 18    "

## 2021-03-26 ENCOUNTER — OFFICE VISIT (OUTPATIENT)
Dept: FAMILY MEDICINE | Facility: CLINIC | Age: 41
End: 2021-03-26
Payer: COMMERCIAL

## 2021-03-26 VITALS
BODY MASS INDEX: 30.29 KG/M2 | DIASTOLIC BLOOD PRESSURE: 84 MMHG | HEART RATE: 61 BPM | OXYGEN SATURATION: 99 % | SYSTOLIC BLOOD PRESSURE: 134 MMHG | WEIGHT: 236 LBS | HEIGHT: 74 IN | TEMPERATURE: 98 F

## 2021-03-26 DIAGNOSIS — E78.5 HYPERLIPIDEMIA LDL GOAL <160: ICD-10-CM

## 2021-03-26 DIAGNOSIS — F43.10 PTSD (POST-TRAUMATIC STRESS DISORDER): ICD-10-CM

## 2021-03-26 DIAGNOSIS — Z00.00 ROUTINE GENERAL MEDICAL EXAMINATION AT A HEALTH CARE FACILITY: Primary | ICD-10-CM

## 2021-03-26 LAB
ALBUMIN SERPL-MCNC: 4.5 G/DL (ref 3.4–5)
ALP SERPL-CCNC: 45 U/L (ref 40–150)
ALT SERPL W P-5'-P-CCNC: 31 U/L (ref 0–70)
ANION GAP SERPL CALCULATED.3IONS-SCNC: 7 MMOL/L (ref 3–14)
AST SERPL W P-5'-P-CCNC: 17 U/L (ref 0–45)
BASOPHILS # BLD AUTO: 0 10E9/L (ref 0–0.2)
BASOPHILS NFR BLD AUTO: 0.5 %
BILIRUB SERPL-MCNC: 1 MG/DL (ref 0.2–1.3)
BUN SERPL-MCNC: 13 MG/DL (ref 7–30)
CALCIUM SERPL-MCNC: 9 MG/DL (ref 8.5–10.1)
CHLORIDE SERPL-SCNC: 106 MMOL/L (ref 94–109)
CHOLEST SERPL-MCNC: 280 MG/DL
CO2 SERPL-SCNC: 25 MMOL/L (ref 20–32)
CREAT SERPL-MCNC: 0.87 MG/DL (ref 0.66–1.25)
DIFFERENTIAL METHOD BLD: ABNORMAL
EOSINOPHIL # BLD AUTO: 0.1 10E9/L (ref 0–0.7)
EOSINOPHIL NFR BLD AUTO: 2.4 %
ERYTHROCYTE [DISTWIDTH] IN BLOOD BY AUTOMATED COUNT: 13 % (ref 10–15)
GFR SERPL CREATININE-BSD FRML MDRD: >90 ML/MIN/{1.73_M2}
GLUCOSE SERPL-MCNC: 94 MG/DL (ref 70–99)
HCT VFR BLD AUTO: 46.5 % (ref 40–53)
HDLC SERPL-MCNC: 48 MG/DL
HGB BLD-MCNC: 15.3 G/DL (ref 13.3–17.7)
LDLC SERPL CALC-MCNC: 200 MG/DL
LYMPHOCYTES # BLD AUTO: 1.4 10E9/L (ref 0.8–5.3)
LYMPHOCYTES NFR BLD AUTO: 37.8 %
MCH RBC QN AUTO: 30.4 PG (ref 26.5–33)
MCHC RBC AUTO-ENTMCNC: 32.9 G/DL (ref 31.5–36.5)
MCV RBC AUTO: 92 FL (ref 78–100)
MONOCYTES # BLD AUTO: 0.3 10E9/L (ref 0–1.3)
MONOCYTES NFR BLD AUTO: 9.2 %
NEUTROPHILS # BLD AUTO: 1.8 10E9/L (ref 1.6–8.3)
NEUTROPHILS NFR BLD AUTO: 50.1 %
NONHDLC SERPL-MCNC: 232 MG/DL
PLATELET # BLD AUTO: 216 10E9/L (ref 150–450)
POTASSIUM SERPL-SCNC: 4 MMOL/L (ref 3.4–5.3)
PROT SERPL-MCNC: 7.8 G/DL (ref 6.8–8.8)
RBC # BLD AUTO: 5.03 10E12/L (ref 4.4–5.9)
SODIUM SERPL-SCNC: 138 MMOL/L (ref 133–144)
TRIGL SERPL-MCNC: 158 MG/DL
TSH SERPL DL<=0.005 MIU/L-ACNC: 1.32 MU/L (ref 0.4–4)
WBC # BLD AUTO: 3.7 10E9/L (ref 4–11)

## 2021-03-26 PROCEDURE — 80061 LIPID PANEL: CPT | Performed by: PHYSICIAN ASSISTANT

## 2021-03-26 PROCEDURE — 36415 COLL VENOUS BLD VENIPUNCTURE: CPT | Performed by: PHYSICIAN ASSISTANT

## 2021-03-26 PROCEDURE — 80050 GENERAL HEALTH PANEL: CPT | Performed by: PHYSICIAN ASSISTANT

## 2021-03-26 PROCEDURE — 99395 PREV VISIT EST AGE 18-39: CPT | Performed by: PHYSICIAN ASSISTANT

## 2021-03-26 ASSESSMENT — PATIENT HEALTH QUESTIONNAIRE - PHQ9
10. IF YOU CHECKED OFF ANY PROBLEMS, HOW DIFFICULT HAVE THESE PROBLEMS MADE IT FOR YOU TO DO YOUR WORK, TAKE CARE OF THINGS AT HOME, OR GET ALONG WITH OTHER PEOPLE: EXTREMELY DIFFICULT
SUM OF ALL RESPONSES TO PHQ QUESTIONS 1-9: 18
SUM OF ALL RESPONSES TO PHQ QUESTIONS 1-9: 18

## 2021-03-26 ASSESSMENT — MIFFLIN-ST. JEOR: SCORE: 2052.3

## 2021-03-26 NOTE — NURSING NOTE
"Chief Complaint   Patient presents with     Physical     initial /80 (BP Location: Right arm, Cuff Size: Adult Large)   Pulse 61   Temp 98  F (36.7  C) (Tympanic)   Ht 1.867 m (6' 1.5\")   Wt 107 kg (236 lb)   SpO2 99%   BMI 30.71 kg/m   Estimated body mass index is 30.71 kg/m  as calculated from the following:    Height as of this encounter: 1.867 m (6' 1.5\").    Weight as of this encounter: 107 kg (236 lb).  BP completed using cuff size: large.  R arm      Health Maintenance that is potentially due pending provider review:  NONE    n/a    Eder Felix ma  "

## 2021-04-01 NOTE — RESULT ENCOUNTER NOTE
Dear Mitesh    Your test results are attached, feel free to contact me via [facility]     I just wanted to make sure that you saw your recent results.  As you expected, your cholesterol continues to be quite high.  Whenever an LDL (bad cholesterol) is over 200, I do recommend medication to treat this.  I do know the coronary calcium test you had a few years ago looked good, there are sometimes limitations what it can detect.  Let me know your thoughts.      Mukund Quach PA-C

## 2021-05-19 ENCOUNTER — MYC MEDICAL ADVICE (OUTPATIENT)
Dept: FAMILY MEDICINE | Facility: CLINIC | Age: 41
End: 2021-05-19

## 2021-06-28 ENCOUNTER — MYC MEDICAL ADVICE (OUTPATIENT)
Dept: FAMILY MEDICINE | Facility: CLINIC | Age: 41
End: 2021-06-28

## 2021-06-29 NOTE — TELEPHONE ENCOUNTER
I am not familiar with this form but it would usually need to be filled out by the provider that has diagnosed him with PTSD.  If he could upload a picture of the form, I may be better able to answer.    Mukund Quach PA-C

## 2021-06-29 NOTE — TELEPHONE ENCOUNTER
JS  Please see Ammado message  Last saw you 3/26/21 for physical  Would you like a VV for this and will CW sign off on this?    Thank you,  Jessica Jones RN

## 2021-07-21 ENCOUNTER — TELEPHONE (OUTPATIENT)
Dept: FAMILY MEDICINE | Facility: CLINIC | Age: 41
End: 2021-07-21

## 2021-07-21 NOTE — TELEPHONE ENCOUNTER
Reason for Call:  Form, our goal is to have forms completed with 72 hours, however, some forms may require a visit or additional information.    Type of letter, form or note:  w/c disabilty form     Who is the form from?: joel bautista rowland  (if other please explain)    Where did the form come from: form was faxed in    What clinic location was the form placed at?: [facility]    Where the form was placed: [facility]  Box/Folder    What number is listed as a contact on the form?: fax [phone]       Additional comments:     Call taken on 7/21/2021 at 11:21 AM by Jagjit Mancia

## 2021-07-28 NOTE — TELEPHONE ENCOUNTER
Called patient no answer and mail box is full unable to leave message.   If patient calls back please inform him that we have received the forms but provider is waiting on clinic manager ( As all legal  forms go to Clinic Manager first and Legal Dept.) Also per providers message from June 29, 2021 those forms should be filled out by provider who diagnosed him with PTSD as provider has only seen him once on March 26, 2021 for physical no prior history.

## 2021-07-28 NOTE — TELEPHONE ENCOUNTER
Patient Returning call and Attorneys have Faxed over 50 page document that needs Mukund Quach's and a MD Signature and date, looks like there is parts to complete patient said they discussed these papers  When complete Then Fax back to Fax number [phone]-Attorneys office  [facility] in Main's In Box  [facility] Coordinator

## 2021-07-28 NOTE — TELEPHONE ENCOUNTER
6/[birth year]2019      Tata MCDANIELS Kasey  [address] [address]  [facility] [address]        To whom it may concern:    This letter is in regards to Tata Kasey who is a patient of mine. Due to bilateral leg weakness and decreased functional mobility concerns she would benefit from a first floor apartment. If you have any questions please call our office.       Sincerely,            Dr. Blair Villafuerte [address] [place] [address] [phone]     Patient called back Informed him the msg and will await our call  [facility] Unit Coordinator

## 2021-07-28 NOTE — TELEPHONE ENCOUNTER
All paperwork from  goes to  first to see if we will fill out or not.  Waiting on that    Mukund Quach PA-C

## 2021-08-03 ENCOUNTER — NURSE TRIAGE (OUTPATIENT)
Dept: NURSING | Facility: CLINIC | Age: 41
End: 2021-08-03

## 2021-08-03 NOTE — TELEPHONE ENCOUNTER
Pt called stating he want to speak to someone at the clinic regarding a form that he needs to be signed off. Pleas reach out to the pt at [phone]. Pt was informed to call back tomorrow to follow up.       Billy Cassidy RN  [facility] Advisors

## 2021-08-04 NOTE — TELEPHONE ENCOUNTER
Would forward this to [employer]ri [employer].  She make the determination for all  forms.    Mukund Quach PA-C

## 2021-08-24 ENCOUNTER — OFFICE VISIT (OUTPATIENT)
Dept: FAMILY MEDICINE | Facility: CLINIC | Age: 41
End: 2021-08-24
Payer: COMMERCIAL

## 2021-08-24 VITALS
HEIGHT: 74 IN | WEIGHT: 237 LBS | RESPIRATION RATE: 16 BRPM | SYSTOLIC BLOOD PRESSURE: 129 MMHG | BODY MASS INDEX: 30.42 KG/M2 | HEART RATE: 95 BPM | DIASTOLIC BLOOD PRESSURE: 83 MMHG | OXYGEN SATURATION: 97 % | TEMPERATURE: 97.7 F

## 2021-08-24 DIAGNOSIS — F43.10 PTSD (POST-TRAUMATIC STRESS DISORDER): Primary | ICD-10-CM

## 2021-08-24 DIAGNOSIS — J06.9 UPPER RESPIRATORY TRACT INFECTION, UNSPECIFIED TYPE: ICD-10-CM

## 2021-08-24 PROCEDURE — 99213 OFFICE O/P EST LOW 20 MIN: CPT | Performed by: INTERNAL MEDICINE

## 2021-08-24 RX ORDER — AZITHROMYCIN 250 MG/1
TABLET, FILM COATED ORAL
Qty: 6 TABLET | Refills: 0 | Status: SHIPPED | OUTPATIENT
Start: 2021-08-24 | End: 2021-08-29

## 2021-08-24 ASSESSMENT — MIFFLIN-ST. JEOR: SCORE: 2056.83

## 2021-08-24 NOTE — PROGRESS NOTES
"[facility]  CLINIC PROGRESS NOTE    Subjective:  PTSD   Mitesh Amaro presents to the clinic today for discussion of diagnosis of PTSD.  He has followed with Dr. Wolf psychologist every other week since the onset of his symptoms.   He presents with a document of 50 pages that describes an outlines his history of previous traumas which were suffered while working as as a  in the [employer].  He notes that he is been struggling with his symptoms of panic and anxiety for many years, and that his more recent symptoms have caused him to be unable to work and perform his job duties.  He did have a formal assessment performed by a psychologist which did confirm this and he was diagnosed with PTSD.  He is never seen a psychiatrist for this.  He does need documentation signed by a medical professional that supports this diagnosis in order to complete the settlement as approved by the [facility].  He has been following with a physicians assistant in the outside clinic, and has requested these forms to be signed by this provider.    Past medical history, medications, allergies, social history, family history reviewed and updated in EPIC as of 8/24/2021 .    ROS  CONSTITUTIONAL: no fatigue, no unexpected change in weight  SKIN: no worrisome rashes, no worrisome moles, no worrisome lesions  EYES: no acute vision problems or changes  ENT: Recent upper respiratory tract infection which has not resolved in the past 2 weeks  RESP: recent respiratory infection   CV: no chest pain, no palpitations   GI: no nausea, no vomiting, no constipation, no diarrhea  : no frequency, no dysuria, no hematuria  MS: no claudication, no myalgias, no joint aches  PSYCHIATRIC: As above      Objective:  Vitals  /83 (BP Location: Left arm, Patient Position: Sitting, Cuff Size: Adult Large)   Pulse 95   Temp 97.7  F (36.5  C) (Temporal)   Resp 16   Ht 1.867 m (6' 1.5\")   Wt 107.5 kg (237 lb)   " SpO2 97%   BMI 30.84 kg/m    GEN: Alert Oriented x3 NAD  HEENT: Atraumatic, normocephalic,    CV: RRR no murmurs or rubs  PULM: CTA no wheezes or crackles  ABD: Soft, nontender nondistended   SKIN: No visible skin lesion or ulcerations  EXT:   no edema bilateral lower extremities  NEURO: Gait and station normal ,   PSYCH: Mood down, distraught, affect mood congruent    No images are attached to the encounter.    No results found for this or any previous visit (from the past 24 hour(s)).    Assessment/Plan:  Patient Instructions   (F43.10) PTSD (post-traumatic stress disorder)  (primary encounter diagnosis)  Comment:  I would recommend that you follow up psychiatry to further investigate this.  Plan: MENTAL HEALTH REFERRAL  - Adult; Psychiatry;         Psychiatry; [facility] - [facility]ry [facility] [phone]; We will contact you to schedule the         appointment or please call with any questions             UPPER RESPIRATORY INFECTION  Comment: Okay for a trial of azithromycin.  I cautioned him to hold off for the next 48 hours to see if symptoms not resolve spontaneously, if not he could fill the prescription for azithromycin which is printed and handed to him    Follow up in 6 months    Disclaimer: This note consists of symbols derived from keyboarding, dictation and/or voice recognition software. As a result, there may be errors in the script that have gone undetected. Please consider this when interpreting information found in this chart.    Linus Collier MD  [phone]

## 2021-08-24 NOTE — PATIENT INSTRUCTIONS
(F43.10) PTSD (post-traumatic stress disorder)  (primary encounter diagnosis)  Comment:  I would recommend that you follow up psychiatry to further investigate this.  Plan: MENTAL HEALTH REFERRAL  - Adult; Psychiatry;         Psychiatry; [facility] - Psychiatry Clinic [phone]; We will contact you to schedule the         appointment or please call with any questions

## 2021-09-01 ENCOUNTER — MYC MEDICAL ADVICE (OUTPATIENT)
Dept: FAMILY MEDICINE | Facility: CLINIC | Age: 41
End: 2021-09-01

## 2021-09-05 ENCOUNTER — HEALTH MAINTENANCE LETTER (OUTPATIENT)
Age: 41
End: 2021-09-05

## 2022-06-12 ENCOUNTER — HEALTH MAINTENANCE LETTER (OUTPATIENT)
Age: 42
End: 2022-06-12

## 2022-10-23 ENCOUNTER — HEALTH MAINTENANCE LETTER (OUTPATIENT)
Age: 42
End: 2022-10-23

## 2023-06-18 ENCOUNTER — HEALTH MAINTENANCE LETTER (OUTPATIENT)
Age: 43
End: 2023-06-18

## 2024-08-11 ENCOUNTER — HEALTH MAINTENANCE LETTER (OUTPATIENT)
Age: 44
End: 2024-08-11